# Patient Record
Sex: MALE | Race: OTHER | HISPANIC OR LATINO
[De-identification: names, ages, dates, MRNs, and addresses within clinical notes are randomized per-mention and may not be internally consistent; named-entity substitution may affect disease eponyms.]

---

## 2023-02-13 ENCOUNTER — APPOINTMENT (OUTPATIENT)
Dept: PEDIATRIC ALLERGY IMMUNOLOGY | Facility: CLINIC | Age: 48
End: 2023-02-13
Payer: COMMERCIAL

## 2023-02-13 VITALS
DIASTOLIC BLOOD PRESSURE: 80 MMHG | TEMPERATURE: 97.1 F | HEIGHT: 65 IN | BODY MASS INDEX: 32.49 KG/M2 | SYSTOLIC BLOOD PRESSURE: 100 MMHG | WEIGHT: 195 LBS

## 2023-02-13 PROBLEM — Z00.00 ENCOUNTER FOR PREVENTIVE HEALTH EXAMINATION: Status: ACTIVE | Noted: 2023-02-13

## 2023-02-13 PROCEDURE — 99203 OFFICE O/P NEW LOW 30 MIN: CPT

## 2023-02-13 RX ORDER — OMEPRAZOLE 20 MG/1
20 CAPSULE, DELAYED RELEASE ORAL
Qty: 90 | Refills: 0 | Status: ACTIVE | COMMUNITY
Start: 2023-02-08

## 2023-02-13 RX ORDER — AMLODIPINE BESYLATE 5 MG/1
5 TABLET ORAL
Qty: 90 | Refills: 0 | Status: ACTIVE | COMMUNITY
Start: 2023-01-26

## 2023-02-13 RX ORDER — AMOXICILLIN AND CLAVULANATE POTASSIUM 875; 125 MG/1; MG/1
875-125 TABLET, COATED ORAL
Qty: 14 | Refills: 0 | Status: ACTIVE | COMMUNITY
Start: 2022-12-25

## 2023-02-13 RX ORDER — METOPROLOL SUCCINATE 50 MG/1
50 TABLET, EXTENDED RELEASE ORAL
Qty: 90 | Refills: 0 | Status: ACTIVE | COMMUNITY
Start: 2023-01-20

## 2023-02-13 NOTE — ASSESSMENT
[FreeTextEntry1] : 1. ?AR - will check immunocap to environmental allergens.\par \par 2. ?SOB - recommend a pulmonology follow up if he continues to feel symptoms

## 2023-02-13 NOTE — SOCIAL HISTORY
[Apartment] : [unfilled] lives in an apartment  [Radiator/Baseboard] : heating provided by radiator(s)/baseboard(s) [Window Units] : air conditioning provided by window units [Humidifier] : uses a humidifier [Dehumidifier] : uses a dehumidifier [None] : none [Dust Mite Covers] : does not have dust mite covers [Feather Pillows] : does not have feather pillows [Feather Comforter] : does not have a feather comforter [Bedroom] : not in the bedroom [Basement] : not in the basement [Living Area] : not in the living area [Smokers in Household] : there are no smokers in the home [de-identified] : air purifier

## 2023-02-13 NOTE — REVIEW OF SYSTEMS
[Nasal Itching] : nasal itching [Throat Itching] : throat itching [Post Nasal Drip] : post nasal drip [SOB with Exertion] : dyspnea on exertion [Nl] : Genitourinary

## 2023-02-13 NOTE — HISTORY OF PRESENT ILLNESS
[Shortness of Breath] : shortness of breath [de-identified] : LISA SNYDER is a 47 year yo male who is here today for allergy symptoms he states since  April  he also has Lymph nodes ge states he might have mold allergies she also is have he also states he feels like he is out of breath more often since Christmas he just found he has high blood pressure he is currently on  Metroprolol 50 mg, amlodipine 5mg, folic acid, famotidine 40 mg, omeprazole 20 mg. he had covid 3 times last covid was in 11/22. would like to get tested because he  might have an allergy to mold he states when near it he feels a reaction.\par \par He feels like he has might have to move away from stuff, ? congestion.

## 2023-03-06 ENCOUNTER — APPOINTMENT (OUTPATIENT)
Dept: PEDIATRIC ALLERGY IMMUNOLOGY | Facility: CLINIC | Age: 48
End: 2023-03-06
Payer: COMMERCIAL

## 2023-03-06 VITALS
TEMPERATURE: 97.1 F | BODY MASS INDEX: 32.49 KG/M2 | HEIGHT: 65 IN | SYSTOLIC BLOOD PRESSURE: 100 MMHG | WEIGHT: 195 LBS | DIASTOLIC BLOOD PRESSURE: 80 MMHG

## 2023-03-06 DIAGNOSIS — J30.89 OTHER ALLERGIC RHINITIS: ICD-10-CM

## 2023-03-06 PROCEDURE — 99213 OFFICE O/P EST LOW 20 MIN: CPT

## 2023-03-06 NOTE — ASSESSMENT
[FreeTextEntry1] : 1. ?AR - will check immunocap to environmental allergens only showed tiny positives to grass, cat, dog and mold\par \par 2. ?SOB - recommend a pulmonology follow up if he continues to feel symptoms

## 2023-03-06 NOTE — HISTORY OF PRESENT ILLNESS
[Shortness of Breath] : shortness of breath [de-identified] : LISA SNYDER is a 47 year yo male who is here today for allergy symptoms he states since April he also has Lymph nodes ge states he might have mold allergies she also is have he also states he feels like he is out of breath more often since [place] he just found he has high blood pressure he is currently on Metroprolol 50 mg, amlodipine 5mg, folic acid, famotidine 40 mg, omeprazole 20 mg. he had covid 3 times last covid was in 11/22. would like to get tested because he might have an allergy to mold he states when near it he feels a reaction.\par \par \par He is here today for lab follow up he is doing okay still feeling short of breath in the cold when hen walks but he states he is doing okay so far no new or worsening issues he did states he made a appointment with the pulmonologist he did state he went for a cat scan and barium test last week\par